# Patient Record
Sex: MALE | Race: BLACK OR AFRICAN AMERICAN | NOT HISPANIC OR LATINO | Employment: FULL TIME | URBAN - METROPOLITAN AREA
[De-identification: names, ages, dates, MRNs, and addresses within clinical notes are randomized per-mention and may not be internally consistent; named-entity substitution may affect disease eponyms.]

---

## 2023-03-11 ENCOUNTER — HOSPITAL ENCOUNTER (EMERGENCY)
Facility: HOSPITAL | Age: 23
Discharge: HOME | End: 2023-03-11
Attending: STUDENT IN AN ORGANIZED HEALTH CARE EDUCATION/TRAINING PROGRAM | Admitting: STUDENT IN AN ORGANIZED HEALTH CARE EDUCATION/TRAINING PROGRAM
Payer: COMMERCIAL

## 2023-03-11 VITALS
OXYGEN SATURATION: 100 % | WEIGHT: 154.4 LBS | HEART RATE: 59 BPM | RESPIRATION RATE: 16 BRPM | DIASTOLIC BLOOD PRESSURE: 76 MMHG | TEMPERATURE: 98.7 F | SYSTOLIC BLOOD PRESSURE: 137 MMHG

## 2023-03-11 DIAGNOSIS — S76.301A RIGHT HAMSTRING INJURY, INITIAL ENCOUNTER: ICD-10-CM

## 2023-03-11 PROCEDURE — 99282 EMERGENCY DEPT VISIT SF MDM: CPT

## 2023-03-12 NOTE — ED TRIAGE NOTES
"Pt was playing basketball on Wednesday when he jumped up and \"felt his hamstring snap\" Has had increasing right leg pain since. CMS in tact.      Triage Assessment     Row Name 03/11/23 6702       Triage Assessment (Adult)    Airway WDL WDL       Respiratory WDL    Respiratory WDL WDL       Skin Circulation/Temperature WDL    Skin Circulation/Temperature WDL WDL       Cardiac WDL    Cardiac WDL WDL       Peripheral/Neurovascular WDL    Peripheral Neurovascular WDL WDL       Cognitive/Neuro/Behavioral WDL    Cognitive/Neuro/Behavioral WDL WDL              "

## 2023-03-12 NOTE — DISCHARGE INSTRUCTIONS
Please call [facility] for a follow-up appointment.  Use ice over the next few days in your back and thigh.  I would also recommend purchasing a foam roller.  Take 600 mg ibuprofen every 6 hours for the 5 days as well as 500 mg of Tylenol every 4 hours for pain.

## 2023-03-12 NOTE — ED PROVIDER NOTES
Emergency Department Encounter         FINAL IMPRESSION:  Hamstring injury        ED COURSE AND MEDICAL DECISION MAKING     6:35 PM I introduced myself to the patient, obtained patient history, performed a physical exam, and discussed plan for ED workup including potential   diagnostic laboratory/imaging studies and interventions.  6:45 PM Patient to be discharged by ED RN.    ED Course as of 03/11/23 1846   Sat Mar 11, 2023   1842 Patient is a 22-year-old male here with right posterior thigh pain for the last 2 days.  States that he was going up for a lay up during basketball when he felt something snap.  States has had pain there in the past and states that this is now the third time he is injured his hamstring.  No knee or hip pain.  No other falls or injuries.  Arrival he looks well.  Ambulatory.  Able  stand from a seated position.  Mild discomfort on the proximal hamstring region with no bruising on examination.  No ischial tuberosity pain suggesting avulsion.  No knee pain.  I suspect hamstring strain.  States that he has had injuries before in the past.  Recommending orthopedic follow-up for most likely physical therapy stretching and further strengthening.                          Medical Decision Making    History:    Supplemental history from: N/A    External Record(s) reviewed: Documented in chart, if applicable.    Work Up:    Chart documentation includes differential considered and any EKGs or imaging independently interpreted by provider, where specified.    In additional to work up documented, I considered the following work up: Documented in chart, if applicable.    External consultation:    Discussion of management with another provider: Documented in chart, if applicable    Complicating factors:    Care impacted by chronic illness: N/A    Care affected by social determinants of health: N/A    Disposition considerations: Discharge. No recommendations on prescription strength medication(s). See  "documentation for any additional details.      Critical Care     Performed by: Avelino Zhao or    Authorized by: Avelino Zhao  Total critical care time:  minutes  Critical care was necessary to treat or prevent imminent or life-threatening deterioration of the following conditions:   Critical care was time spent personally by me on the following activities: development of treatment plan with patient or surrogate, discussions with consultants, examination of patient, evaluation of patient's response to treatment, obtaining history from patient or surrogate, ordering and performing treatments and interventions, ordering and review of laboratory studies, ordering and review of radiographic studies, re-evaluation of patient's condition and monitoring for potential decompensation.  Critical care time was exclusive of separately billable procedures and treating other patients.'    At the conclusion of the encounter I discussed the results of all the tests and the disposition. The questions were answered. The patient or family acknowledged understanding and was agreeable with the care plan.                  MEDICATIONS GIVEN IN THE EMERGENCY DEPARTMENT:  Medications - No data to display    NEW PRESCRIPTIONS STARTED AT TODAY'S ED VISIT:  New Prescriptions    No medications on file       HPI     Patient information obtained from: Patient    Use of Interpretor: N/A    Corwin Bernabe III is a 22 year old male with no pertinent history who presents to this ED by private car for evaluation of right leg pain.    The patient reports he was playing basketball 4 days ago and while going up for a lay up felt a \"pop\" in his posterior right thigh and had immediate onset of pain. He has since had increasing pain in his right posterior leg that is worse when walking. He has been icing the leg without much improvement. He reports he has sustained similar injuries twice before. He denies any right knee pain or right hip pain.    REVIEW OF " SYSTEMS:  Review of Systems   Constitutional: Negative for fever, malaise  HEENT: Negative runny nose, sore throat, ear pain, neck pain  Respiratory: Negative for shortness of breath, cough, congestion  Cardiovascular: Negative for chest pain, leg edema  Gastrointestinal: Negative for abdominal distention, abdominal pain, constipation, vomiting, nausea, diarrhea  Genitourinary: Negative for dysuria and hematuria.   Integument: Negative for rash, skin breakdown  Neurological: Negative for paresthesias, weakness, headache.  Musculoskeletal: Negative for joint pain, joint swelling. Positive for right posterior thigh pain.      All other systems reviewed and are negative.          MEDICAL HISTORY     No past medical history on file.    No past surgical history on file.         No current outpatient medications on file.          PHYSICAL EXAM     /76   Pulse 59   Temp 98.7  F (37.1  C) (Oral)   Resp 16   Wt 70 kg (154 lb 6.4 oz)   SpO2 100%       PHYSICAL EXAM:     General: Patient appears well, nontoxic, comfortable  HEENT: Moist mucous membranes,  No head trauma.  No midline neck pain.  Musculoskeletal: Full range of motion of joints, no deformities appreciated. Able to stand from a seated position.  Mild discomfort on the proximal hamstring region with no bruising on examination.  No ischial tuberosity pain suggesting avulsion.  No knee pain.  Neurological: Alert and oriented, grossly neurologically intact.  Psychological: Normal affect and mood.  Integument: No rashes appreciated    RESULTS       Labs Ordered and Resulted from Time of ED Arrival to Time of ED Departure - No data to display    No orders to display                     PROCEDURES:  Procedures:  Procedures       Rubens VITALE am serving as a scribe to document services personally performed by Avelino Zhao DO, based on my observations and the provider's statements to me.  Avelino VITALE DO, attest that Rubens Jefferson is acting in a scribe  capacity, has observed my performance of the services and has documented them in accordance with my direction.    Avelino Zhao DO  Emergency Medicine  [facility] EMERGENCY DEPARTMENT     Pavel, Avelino Bailey DO  03/11/23 0556